# Patient Record
Sex: MALE | Race: BLACK OR AFRICAN AMERICAN | NOT HISPANIC OR LATINO | Employment: UNEMPLOYED | URBAN - METROPOLITAN AREA
[De-identification: names, ages, dates, MRNs, and addresses within clinical notes are randomized per-mention and may not be internally consistent; named-entity substitution may affect disease eponyms.]

---

## 2017-12-07 ENCOUNTER — HOSPITAL ENCOUNTER (EMERGENCY)
Facility: MEDICAL CENTER | Age: 32
End: 2017-12-07
Attending: EMERGENCY MEDICINE
Payer: MEDICAID

## 2017-12-07 VITALS
HEART RATE: 86 BPM | SYSTOLIC BLOOD PRESSURE: 122 MMHG | RESPIRATION RATE: 18 BRPM | BODY MASS INDEX: 18.41 KG/M2 | HEIGHT: 73 IN | DIASTOLIC BLOOD PRESSURE: 81 MMHG | TEMPERATURE: 98.8 F | WEIGHT: 138.89 LBS | OXYGEN SATURATION: 100 %

## 2017-12-07 DIAGNOSIS — S01.81XA FACIAL LACERATION, INITIAL ENCOUNTER: ICD-10-CM

## 2017-12-07 PROCEDURE — 303485 HCHG DRESSING MEDIUM

## 2017-12-07 PROCEDURE — 303747 HCHG EXTRA SUTURE

## 2017-12-07 PROCEDURE — 99283 EMERGENCY DEPT VISIT LOW MDM: CPT

## 2017-12-07 PROCEDURE — 700111 HCHG RX REV CODE 636 W/ 250 OVERRIDE (IP): Performed by: EMERGENCY MEDICINE

## 2017-12-07 PROCEDURE — 90471 IMMUNIZATION ADMIN: CPT

## 2017-12-07 PROCEDURE — 700101 HCHG RX REV CODE 250

## 2017-12-07 PROCEDURE — 90715 TDAP VACCINE 7 YRS/> IM: CPT | Performed by: EMERGENCY MEDICINE

## 2017-12-07 PROCEDURE — 304999 HCHG REPAIR-SIMPLE/INTERMED LEVEL 1

## 2017-12-07 PROCEDURE — 304217 HCHG IRRIGATION SYSTEM

## 2017-12-07 RX ORDER — LIDOCAINE HYDROCHLORIDE AND EPINEPHRINE BITARTRATE 20; .01 MG/ML; MG/ML
INJECTION, SOLUTION SUBCUTANEOUS
Status: COMPLETED
Start: 2017-12-07 | End: 2017-12-07

## 2017-12-07 RX ADMIN — CLOSTRIDIUM TETANI TOXOID ANTIGEN (FORMALDEHYDE INACTIVATED), CORYNEBACTERIUM DIPHTHERIAE TOXOID ANTIGEN (FORMALDEHYDE INACTIVATED), BORDETELLA PERTUSSIS TOXOID ANTIGEN (GLUTARALDEHYDE INACTIVATED), BORDETELLA PERTUSSIS FILAMENTOUS HEMAGGLUTININ ANTIGEN (FORMALDEHYDE INACTIVATED), BORDETELLA PERTUSSIS PERTACTIN ANTIGEN, AND BORDETELLA PERTUSSIS FIMBRIAE 2/3 ANTIGEN 0.5 ML: 5; 2; 2.5; 5; 3; 5 INJECTION, SUSPENSION INTRAMUSCULAR at 20:16

## 2017-12-07 RX ADMIN — LIDOCAINE HYDROCHLORIDE AND EPINEPHRINE: 20; 10 INJECTION, SOLUTION INFILTRATION; PERINEURAL at 19:15
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
## 2017-12-08 NOTE — DISCHARGE INSTRUCTIONS
Facial Laceration   A facial laceration is a cut on the face. These injuries can be painful and cause bleeding. Lacerations usually heal quickly, but they need special care to reduce scarring.  DIAGNOSIS   Your health care provider will take a medical history, ask for details about how the injury occurred, and examine the wound to determine how deep the cut is.  TREATMENT   Some facial lacerations may not require closure. Others may not be able to be closed because of an increased risk of infection. The risk of infection and the chance for successful closure will depend on various factors, including the amount of time since the injury occurred.  The wound may be cleaned to help prevent infection. If closure is appropriate, pain medicines may be given if needed. Your health care provider will use stitches (sutures), wound glue (adhesive), or skin adhesive strips to repair the laceration. These tools bring the skin edges together to allow for faster healing and a better cosmetic outcome. If needed, you may also be given a tetanus shot.  HOME CARE INSTRUCTIONS  · Only take over-the-counter or prescription medicines as directed by your health care provider.  · Follow your health care provider's instructions for wound care. These instructions will vary depending on the technique used for closing the wound.  For Sutures:  · Keep the wound clean and dry.    · If you were given a bandage (dressing), you should change it at least once a day. Also change the dressing if it becomes wet or dirty, or as directed by your health care provider.    · Wash the wound with soap and water 2 times a day. Rinse the wound off with water to remove all soap. Pat the wound dry with a clean towel.    · After cleaning, apply a thin layer of the antibiotic ointment recommended by your health care provider. This will help prevent infection and keep the dressing from sticking.    · You may shower as usual after the first 24 hours. Do not soak the  wound in water until the sutures are removed.    · Get your sutures removed as directed by your health care provider. With facial lacerations, sutures should usually be taken out after 4-5 days to avoid stitch marks.    · Wait a few days after your sutures are removed before applying any makeup.  For Skin Adhesive Strips:  · Keep the wound clean and dry.    · Do not get the skin adhesive strips wet. You may bathe carefully, using caution to keep the wound dry.    · If the wound gets wet, pat it dry with a clean towel.    · Skin adhesive strips will fall off on their own. You may trim the strips as the wound heals. Do not remove skin adhesive strips that are still stuck to the wound. They will fall off in time.    For Wound Adhesive:  · You may briefly wet your wound in the shower or bath. Do not soak or scrub the wound. Do not swim. Avoid periods of heavy sweating until the skin adhesive has fallen off on its own. After showering or bathing, gently pat the wound dry with a clean towel.    · Do not apply liquid medicine, cream medicine, ointment medicine, or makeup to your wound while the skin adhesive is in place. This may loosen the film before your wound is healed.    · If a dressing is placed over the wound, be careful not to apply tape directly over the skin adhesive. This may cause the adhesive to be pulled off before the wound is healed.    · Avoid prolonged exposure to sunlight or tanning lamps while the skin adhesive is in place.  · The skin adhesive will usually remain in place for 5-10 days, then naturally fall off the skin. Do not pick at the adhesive film.    After Healing:  Once the wound has healed, cover the wound with sunscreen during the day for 1 full year. This can help minimize scarring. Exposure to ultraviolet light in the first year will darken the scar. It can take 1-2 years for the scar to lose its redness and to heal completely.   SEEK MEDICAL CARE IF:  · You have a fever.  SEEK IMMEDIATE  MEDICAL CARE IF:  · You have redness, pain, or swelling around the wound.    · You see a yellowish-white fluid (pus) coming from the wound.       This information is not intended to replace advice given to you by your health care provider. Make sure you discuss any questions you have with your health care provider.     Document Released: 01/25/2006 Document Revised: 01/08/2016 Document Reviewed: 07/31/2014  [employer] Patient Education ©2016 [employer]

## 2017-12-08 NOTE — ED NOTES
Pt discharged home. Assessment complete. Pt ambulates self. VS stable. Pt verbalized discharge instructions.Pt requesting to speak to MD about caring for wound. ERP notified.

## 2017-12-08 NOTE — ED NOTES
"Pt given on call dermatologist info per ERP request. 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 Repeated education provided from ERP and RN.   "

## 2017-12-09 ENCOUNTER — HOSPITAL ENCOUNTER (EMERGENCY)
Dept: HOSPITAL 8 - ED | Age: 32
Discharge: HOME | End: 2017-12-09
Payer: MEDICAID

## 2017-12-09 VITALS — HEIGHT: 73 IN | WEIGHT: 142.64 LBS | BODY MASS INDEX: 18.9 KG/M2

## 2017-12-09 VITALS — SYSTOLIC BLOOD PRESSURE: 99 MMHG | DIASTOLIC BLOOD PRESSURE: 59 MMHG
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
## 2017-12-09 DIAGNOSIS — S01.112D: Primary | ICD-10-CM

## 2017-12-09 PROCEDURE — 99282 EMERGENCY DEPT VISIT SF MDM: CPT

## 2017-12-18 ENCOUNTER — HOSPITAL ENCOUNTER (EMERGENCY)
Facility: MEDICAL CENTER | Age: 32
End: 2017-12-18
Payer: MEDICAID

## 2017-12-18 VITALS
TEMPERATURE: 99.2 F | SYSTOLIC BLOOD PRESSURE: 104 MMHG | WEIGHT: 146.61 LBS | OXYGEN SATURATION: 94 % | HEART RATE: 63 BPM | DIASTOLIC BLOOD PRESSURE: 66 MMHG | BODY MASS INDEX: 19.34 KG/M2 | RESPIRATION RATE: 18 BRPM

## 2017-12-18 PROCEDURE — 302449 STATCHG TRIAGE ONLY (STATISTIC)

## 2017-12-19 NOTE — ED NOTES
PT ambulated to triage c/o wanting to have his eyebrow wound looked at by a physician.  Per pt his sutures are dissolvable.  PT is very uncooperative  Chief Complaint   Patient presents with   • Wound Check   Blood pressure 104/66, pulse 63, temperature 37.3 °C (99.2 °F), resp. rate 18, weight 66.5 kg (146 lb 9.7 oz), SpO2 94 %.

## 2018-08-30 ENCOUNTER — HOSPITAL ENCOUNTER (EMERGENCY)
Dept: HOSPITAL 8 - ED | Age: 33
Discharge: LEFT BEFORE DISCHARGE | End: 2018-08-30
Payer: MEDICAID

## 2018-08-30 VITALS — DIASTOLIC BLOOD PRESSURE: 69 MMHG | SYSTOLIC BLOOD PRESSURE: 103 MMHG

## 2018-08-30 VITALS — WEIGHT: 165.35 LBS | HEIGHT: 73 IN | BODY MASS INDEX: 21.91 KG/M2

## 2018-08-30 DIAGNOSIS — Z72.9: ICD-10-CM

## 2018-08-30 DIAGNOSIS — F41.1: Primary | ICD-10-CM

## 2018-08-30 PROCEDURE — 99283 EMERGENCY DEPT VISIT LOW MDM: CPT

## 2018-08-30 PROCEDURE — 93005 ELECTROCARDIOGRAM TRACING: CPT

## 2018-08-31 ENCOUNTER — HOSPITAL ENCOUNTER (EMERGENCY)
Facility: MEDICAL CENTER | Age: 33
End: 2018-08-31
Attending: EMERGENCY MEDICINE
Payer: MEDICAID

## 2018-08-31 VITALS
HEIGHT: 73 IN | WEIGHT: 156.31 LBS | RESPIRATION RATE: 16 BRPM | DIASTOLIC BLOOD PRESSURE: 90 MMHG | OXYGEN SATURATION: 99 % | HEART RATE: 76 BPM | TEMPERATURE: 97.6 F | SYSTOLIC BLOOD PRESSURE: 123 MMHG | BODY MASS INDEX: 20.72 KG/M2

## 2018-08-31 PROCEDURE — 302449 STATCHG TRIAGE ONLY (STATISTIC)

## 2018-08-31 ASSESSMENT — PAIN SCALES - GENERAL: PAINLEVEL_OUTOF10: 9

## 2018-08-31 NOTE — ED TRIAGE NOTES
Geoffrey Ambrosio  32 y.o.  male  Chief Complaint   Patient presents with   • Blood in Vomit   • Dizziness     x 2 months     Present to triage with multiple complaints. States that blood in vomit ( streak ) x 1 day. Dizziness and nausea x 2 months. Back pain 9/10. VSS

## 2018-08-31 NOTE — ED NOTES
"Pt in room, starring at bed. Refusing to change into a gown. States \"nah, I don't feel comfortable doing that\". Explained to pt that the MD would want to properly assess pt, but he has that right to refuse. Chay Davis attempted to check vital signs on pt. Pt states \"nah, I don't want anyone touching me, I'm not getting a good vibe\". Pt up pacing in room. Has multiple [employer] bags in room. Pt confirms with triage note that symptoms have been going on for past 2 months. Denies alcohol or drug use.  "

## 2018-08-31 NOTE — ED NOTES
Report from Taina BLAS. Pt pacing in and out of room. Refuses to wash hands in room, escorted to restroom outside of room. Pt returned to room, ERP at bedside. Pt picked up his belongings and left his room. Pt did not explain to me his reason for leaving but left with security.

## 2019-05-08 ENCOUNTER — HOSPITAL ENCOUNTER (EMERGENCY)
Dept: HOSPITAL 8 - ED | Age: 34
Discharge: LEFT BEFORE DISCHARGE | End: 2019-05-08
Payer: MEDICAID
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
## 2019-05-08 DIAGNOSIS — S05.92XA: Primary | ICD-10-CM
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
## 2019-05-08 DIAGNOSIS — Z53.21: ICD-10-CM
